# Patient Record
(demographics unavailable — no encounter records)

---

## 2025-05-16 NOTE — REVIEW OF SYSTEMS
[Decreased Libido] : decreased libido [PMS/PMDD Symptoms] : PMS/PMDD symptoms [Negative] : Genitourinary

## 2025-05-19 NOTE — COUNSELING
[Nutrition/ Exercise/ Weight Management] : nutrition, exercise, weight management [Vitamins/Supplements] : vitamins/supplements [Sunscreen] : sunscreen [Drugs] : drugs [Breast Self Exam] : breast self exam [Lab Results] : lab results [Medication Management] : medication management

## 2025-05-19 NOTE — HISTORY OF PRESENT ILLNESS
[FreeTextEntry1] : 41 p2  Patient here for well woman exam.  Denies GYN complaints.  Sexually active.  Exercises.  Started vaginal estrogen for vaginal odor which has been helpfull for odor and itching. Hx of sexually of sexually. Sensei focus technique Seeing a sex therapist and has a separate counselor.  Takes progesterone for irritability and extreme mood swings for PMS to be taken for 12 days before her cycles. and Testerone for libido and low energy. Has been trialing for 1 month Prescribed by Functional medicine.  OB history:  x2  GYN history: denies fibroids, cyst. abnormal pap  Pap smear: 21 NILM on record. Last pap was optum in , wnl  Mammogram/US 25: BIRADS II, extremely dense TC score 28.9%

## 2025-05-19 NOTE — PHYSICAL EXAM
[Chaperoned Physical Exam] : A chaperone was present in the examining room during all aspects of the physical examination. [Appropriately responsive] : appropriately responsive [Alert] : alert [No Acute Distress] : no acute distress [No Lymphadenopathy] : no lymphadenopathy [Regular Rate Rhythm] : regular rate rhythm [No Murmurs] : no murmurs [Clear to Auscultation B/L] : clear to auscultation bilaterally [Soft] : soft [Non-tender] : non-tender [Non-distended] : non-distended [No HSM] : No HSM [No Lesions] : no lesions [No Mass] : no mass [Oriented x3] : oriented x3 [Examination Of The Breasts] : a normal appearance [No Masses] : no breast masses were palpable [Labia Majora] : normal [Labia Minora] : normal [Normal] : normal [Normal Position] : in a normal position [Uterine Adnexae] : non-palpable [FreeTextEntry2] : ANDREW Lam [Tenderness] : nontender